# Patient Record
(demographics unavailable — no encounter records)

---

## 2024-10-25 NOTE — HISTORY OF PRESENT ILLNESS
[FreeTextEntry1] : Here for follow-up Pt declined starting a statin for LDL above 190 as recommended after the previous visit. [de-identified] : Pt is requesting a testosterone level.

## 2024-10-25 NOTE — HISTORY OF PRESENT ILLNESS
[FreeTextEntry1] : Here for follow-up Pt declined starting a statin for LDL above 190 as recommended after the previous visit. [de-identified] : Pt is requesting a testosterone level.

## 2024-11-01 NOTE — HISTORY OF PRESENT ILLNESS
[FreeTextEntry1] : Pt has a skin tag on his back that he would like to have removed [de-identified] : Requests renewal of Pulmicort

## 2025-01-23 NOTE — PHYSICAL EXAM
[Alert] : alert [Oriented x 3] : ~L oriented x 3 [Well Nourished] : well nourished [Conjunctiva Non-injected] : conjunctiva non-injected [No Visual Lymphadenopathy] : no visual  lymphadenopathy [No Clubbing] : no clubbing [No Edema] : no edema [No Bromhidrosis] : no bromhidrosis [No Chromhidrosis] : no chromhidrosis [FreeTextEntry3] : R back with pedunculated skin colored papule Greasy scale on the forehead, glabella and NLF

## 2025-01-23 NOTE — HISTORY OF PRESENT ILLNESS
[FreeTextEntry1] : growth on the back [de-identified] : 32M with no personal hx of skin cancer here for growth on the back. Gets irritated Also with scaling on the face x months. No treatments tried

## 2025-07-03 NOTE — PHYSICAL EXAM
[No Acute Distress] : no acute distress [Well Nourished] : well nourished [Well Developed] : well developed [Well-Appearing] : well-appearing [Normal Sclera/Conjunctiva] : normal sclera/conjunctiva [PERRL] : pupils equal round and reactive to light [EOMI] : extraocular movements intact [Normal Outer Ear/Nose] : the outer ears and nose were normal in appearance [Normal Oropharynx] : the oropharynx was normal [No JVD] : no jugular venous distention [No Lymphadenopathy] : no lymphadenopathy [Supple] : supple [Thyroid Normal, No Nodules] : the thyroid was normal and there were no nodules present [No Respiratory Distress] : no respiratory distress  [No Accessory Muscle Use] : no accessory muscle use [Clear to Auscultation] : lungs were clear to auscultation bilaterally [Normal Rate] : normal rate  [Regular Rhythm] : with a regular rhythm [Normal S1, S2] : normal S1 and S2 [No Murmur] : no murmur heard [No Carotid Bruits] : no carotid bruits [No Abdominal Bruit] : a ~M bruit was not heard ~T in the abdomen [No Varicosities] : no varicosities [Pedal Pulses Present] : the pedal pulses are present [No Edema] : there was no peripheral edema [No Palpable Aorta] : no palpable aorta [No Extremity Clubbing/Cyanosis] : no extremity clubbing/cyanosis [Soft] : abdomen soft [Non Tender] : non-tender [Non-distended] : non-distended [No Masses] : no abdominal mass palpated [No HSM] : no HSM [Normal Bowel Sounds] : normal bowel sounds [Normal Posterior Cervical Nodes] : no posterior cervical lymphadenopathy [Normal Anterior Cervical Nodes] : no anterior cervical lymphadenopathy [No CVA Tenderness] : no CVA  tenderness [No Spinal Tenderness] : no spinal tenderness [No Joint Swelling] : no joint swelling [Grossly Normal Strength/Tone] : grossly normal strength/tone [No Rash] : no rash [Coordination Grossly Intact] : coordination grossly intact [No Focal Deficits] : no focal deficits [Normal Gait] : normal gait [Deep Tendon Reflexes (DTR)] : deep tendon reflexes were 2+ and symmetric [Normal Affect] : the affect was normal [Normal Insight/Judgement] : insight and judgment were intact [de-identified] : right pinky deviates outward 5-10mdegrees, nontender

## 2025-07-03 NOTE — HISTORY OF PRESENT ILLNESS
[FreeTextEntry8] : Pt is concerned because his right pinky is deviating outward. He hit his hand on a refrigeratior door 1-2 weeks ago. He had an x-ray [facility] and was told it was normal. He is still concerned.

## 2025-07-28 NOTE — DISCUSSION/SUMMARY
[FreeTextEntry1] :  He has findings consistent with an abducted right little finger possibly secondary to an injury of the MCP joint radiocarpal ligament after he injured it several weeks ago.  I had a discussion with the patient regarding today's visit, the prognosis of this diagnosis, and treatment recommendations and options. At this time, I recommended further imaging of the right little finger with an MRI. He will follow-up after his MRI to review the results and discuss further treatment recommendations.  The patient has agreed to the above plan of management and has expressed full understanding.  All questions were fully answered to the patient's satisfaction.  My cumulative time spent on this visit included: Preparation for the visit, review of the medical records, review of pertinent diagnostic studies, examination and counseling of the patient on the above diagnosis, treatment plan and prognosis, orders of diagnostic tests, medication and/or appropriate procedures and documentation in the medical records of today's visit.

## 2025-07-28 NOTE — PHYSICAL EXAM
[de-identified] : - Constitutional: This is a male in no obvious distress.   - Psych: Patient is alert and oriented to person, place and time.  Patient has a normal mood and affect.  - Cardiovascular: Normal pulses throughout the upper extremities.  No significant varicosities are noted in the upper extremities.   ---   Examination of his right hand little finger demonstrates an ulnar deviated posture.  There is no obvious swelling.  There is no tenderness along the MCP joint radial or ulnar collateral ligaments or along the PIP joint collateral ligaments.  He has full flexion and extension.  There is no obvious pain or instability to stress testing of the MCP joint radial collateral ligament.  He is neurovascularly intact distally.           [de-identified] : AP, lateral radiographs of his right hand and separate lateral of the little finger demonstrate no fractures, dislocations, or arthritis, the little finger is ulnarly deviated.

## 2025-07-28 NOTE — CONSULT LETTER
[Dear  ___] : Dear  [unfilled], [Consult Letter:] : I had the pleasure of evaluating your patient, [unfilled]. [Please see my note below.] : Please see my note below. [Consult Closing:] : Thank you very much for allowing me to participate in the care of this patient.  If you have any questions, please do not hesitate to contact me. [Sincerely,] : Sincerely, [FreeTextEntry3] : Victorino Edmond M.D. Surgery of the Hand & Upper Extremity Orthopaedic Surgery Chief, Hand Service, [facility]  of Orthopedic Surgery, [facility] [employer] [facility]Email: [email or web address] Office Phone: [phone]

## 2025-07-28 NOTE — END OF VISIT
[FreeTextEntry3] : This note was written by Juan Ying on 07/28/2025 acting solely as a scribe for Dr. Victorino Edmond.   All medical record entries made by the Scribe were at my, Dr. Victorino Edmond, direction and personally dictated by me on 07/28/2025. I have personally reviewed the chart and agree that the record accurately reflects my personal performance of the history, physical exam, assessment and plan.

## 2025-07-28 NOTE — CONSULT LETTER
[Dear  ___] : Dear  [unfilled], [Consult Letter:] : I had the pleasure of evaluating your patient, [unfilled]. [Please see my note below.] : Please see my note below. [Consult Closing:] : Thank you very much for allowing me to participate in the care of this patient.  If you have any questions, please do not hesitate to contact me. [Sincerely,] : Sincerely, [FreeTextEntry3] : Victorino Edmond M.D. Surgery of the Hand & Upper Extremity Orthopaedic Surgery Chief, Hand Service, [facility]  of Orthopedic Surgery, [facility] [employer] at [facility]: [email or web address] Office Phone: [phone]

## 2025-07-28 NOTE — HISTORY OF PRESENT ILLNESS
[Right] : right hand dominant [FreeTextEntry1] : He comes in today for evaluation of right little finger stiffness that has been ongoing for several weeks after hitting his hand on a cooler door. He denies swelling, clicking or pain.   He works at [employer].  He was referred by Dr. Blankenship.

## 2025-07-28 NOTE — PHYSICAL EXAM
[de-identified] : - Constitutional: This is a male in no obvious distress.   - Psych: Patient is alert and oriented to person, place and time.  Patient has a normal mood and affect.  - Cardiovascular: Normal pulses throughout the upper extremities.  No significant varicosities are noted in the upper extremities.   ---   Examination of his right hand little finger demonstrates an ulnar deviated posture.  There is no obvious swelling.  There is no tenderness along the MCP joint radial or ulnar collateral ligaments or along the PIP joint collateral ligaments.  He has full flexion and extension.  There is no obvious pain or instability to stress testing of the MCP joint radial collateral ligament.  He is neurovascularly intact distally.           [de-identified] : AP, lateral radiographs of his right hand and separate lateral of the little finger demonstrate no fractures, dislocations, or arthritis, the little finger is ulnarly deviated.

## 2025-07-28 NOTE — ADDENDUM
[FreeTextEntry1] :  I, Juan Ying, acted solely as a scribe for Dr. Edmond on this date on 07/28/2025.